# Patient Record
Sex: MALE | Race: WHITE | NOT HISPANIC OR LATINO | Employment: FULL TIME | URBAN - METROPOLITAN AREA
[De-identification: names, ages, dates, MRNs, and addresses within clinical notes are randomized per-mention and may not be internally consistent; named-entity substitution may affect disease eponyms.]

---

## 2017-08-19 ENCOUNTER — OFFICE VISIT (OUTPATIENT)
Dept: URGENT CARE | Facility: CLINIC | Age: 32
End: 2017-08-19
Payer: COMMERCIAL

## 2017-08-19 VITALS
OXYGEN SATURATION: 96 % | HEART RATE: 105 BPM | TEMPERATURE: 97 F | DIASTOLIC BLOOD PRESSURE: 86 MMHG | SYSTOLIC BLOOD PRESSURE: 145 MMHG | RESPIRATION RATE: 16 BRPM

## 2017-08-19 DIAGNOSIS — L03.317 CELLULITIS OF GLUTEAL REGION: ICD-10-CM

## 2017-08-19 DIAGNOSIS — L02.91 ABSCESS: Primary | ICD-10-CM

## 2017-08-19 PROCEDURE — 99203 OFFICE O/P NEW LOW 30 MIN: CPT | Mod: S$GLB,,, | Performed by: INTERNAL MEDICINE

## 2017-08-19 RX ORDER — SULFAMETHOXAZOLE AND TRIMETHOPRIM 800; 160 MG/1; MG/1
1 TABLET ORAL 2 TIMES DAILY
Qty: 20 TABLET | Refills: 0 | Status: SHIPPED | OUTPATIENT
Start: 2017-08-19 | End: 2017-08-29

## 2017-08-19 RX ORDER — AMOXICILLIN AND CLAVULANATE POTASSIUM 875; 125 MG/1; MG/1
1 TABLET, FILM COATED ORAL 2 TIMES DAILY
Qty: 20 TABLET | Refills: 0 | Status: SHIPPED | OUTPATIENT
Start: 2017-08-19 | End: 2017-08-29

## 2017-08-19 RX ORDER — MUPIROCIN 20 MG/G
OINTMENT TOPICAL
Qty: 22 G | Refills: 1 | Status: SHIPPED | OUTPATIENT
Start: 2017-08-19 | End: 2021-07-03

## 2017-08-19 NOTE — LETTER
August 19, 2017      [facility]  [place]  [address]  [place] [address]  Phone: [phone]  Fax: [phone]       Patient: Maxime Alcala   YOB: 1985  Date of Visit: 08/19/2017    To Whom It May Concern:    Delicia Alcala  was at [facility] on  LETTER:[id number]} may return to work/school on 08/22/2017 with no restrictions. If you have any questions or concerns, or if I can be of further assistance, please do not hesitate to contact me.    Sincerely   DR Garcia

## 2017-08-19 NOTE — PATIENT INSTRUCTIONS
Abscess (Antibiotic Treatment Only)  An abscess (sometimes called a boil) happens when bacteria get trapped under the skin and start to grow. Pus forms inside the abscess as the body responds to the bacteria. An abscess can happen with an insect bite, ingrown hair, blocked oil gland, pimple, cyst, or puncture wound.  In the early stages, your wound may be red and tender. For this stage, you may get antibiotics. If the abscess does not get better with antibiotics, it will need to be drained with a small cut.  Home care  These tips will help you care for your abscess at home:  · Soak the wound in hot water or apply hot packs (small towel soaked in hot water) to the area for 20 minutes at a time. Do this 3 to 4 times a day.  · Do not cut, squeeze, or pop the boil yourself.  · Apply antibiotic cream or ointment to the skin 3 to 4 times a day, unless something else was prescribed. Some ointments include an antibiotic plus a pain reliever.  · If your doctor prescribed antibiotics, do not stop taking them until you have finished the medicine or the doctor tells you to stop.  · You may use an over-the-counter pain medicine to control pain, unless another pain medicine was prescribed. If you have chronic liver or kidney disease or ever had a stomach ulcer or gastrointestinal bleeding, talk with your doctor before using these any of these.  Follow-up care  Follow up with your healthcare provider, or as advised. Check your wound each day for the signs of worsening infection listed below.  When to seek medical advice  Get prompt medical attention if any of these occur:  · An increase in redness or swelling  · Red streaks in the skin leading away from the abscess  · An increase in local pain or swelling  · Fever of 100.4ºF (38ºC) or higher, or as directed by your healthcare provider  · Pus or fluid coming from the abscess  · Boil returns after getting better  Date Last Reviewed: 9/1/2016  © 9040-9581 [employer]  [employer]. [address] [place] [address]. All rights reserved. This information is not intended as a substitute for professional medical care. Always follow your healthcare professional's instructions.  Please return here or go to the Emergency Department for any concerns or worsening of condition.  If you were prescribed antibiotics, please take them to completion.  If you were prescribed a narcotic medication, do not drive or operate heavy equipment or machinery while taking these medications.  Please follow up with your primary care doctor or specialist as needed.    If you  smoke, please stop smoking.  Domeboro over the counter(pulls infection out of wound)  Mix two packets to a quart of boiled water.  Make sure bowl you mixed it in has a lid and is micro-waveable .  Take 3 clean wash cloths and put one into solution that you mixed and place on affected area(very warm solution but NOT BOILING  HOT) for 10 minutes then discard to dirty laundry repeat with second clean wash cloth (dip into solution) and third wash cloth each for 10 minutes(total soaks on affected area are for 30 minutes).  Then clean area with soap and water put either Neosporin or other antibiotic creams to affected area and bandage with sterile/clean dressing.  Place lid on solution and put in refrigerator then when ready to repeat process take bowl out and place in microwave until solution is steamming .  REPEAT Put warm compresses on affected area 4 to 5 times a day for the first 5 to 6 days.  Domeboro over the counter(pulls infection out of wound)  Mix two packets to a quart of boiled water.  Make sure bowl you mixed it in has a lid and is micro-waveable .  Take 3 clean wash cloths and put one into solution that you mixed and place on affected area(very warm solution but NOT BOILING  HOT) for 10 minutes then discard to dirty laundry repeat with second clean wash cloth (dip into solution) and third wash cloth each for 10 minutes(total  soaks on affected area are for 30 minutes).  Then clean area with soap and water put either Neosporin or other antibiotic creams to affected area and bandage with sterile/clean dressing.  Place lid on solution and put in refrigerator then when ready to repeat process take bowl out and place in microwave until solution is steamming .  REPEAT Put warm compresses on affected area 4 to 5 times a day for the first 5 to 6 days.  .

## 2017-08-19 NOTE — PROGRESS NOTES
Subjective:       Patient ID: Maxime Alcala is a 31 y.o. male.    Vitals:  oral temperature is 97.3 °F (36.3 °C). His blood pressure is 145/86 (abnormal) and his pulse is 105. His respiration is 16 and oxygen saturation is 96%.     Chief Complaint: Abscess    Abscess   Chronicity:  New  Onset:  Several hours ago  Progression Since Onset: rapidly worsening  Abscess location: buttocks.  Associated Symptoms: no fever, no chills  Characteristics: draining    Pain Scale:  4/10  Treatments Tried:  Draining/squeezing  Relieved by:  Nothing  Worsened by:  Nothing    Review of Systems   Constitution: Negative for chills and fever.   HENT: Negative for headaches and sore throat.    Eyes: Negative for blurred vision.   Cardiovascular: Negative for chest pain.   Respiratory: Negative for shortness of breath.    Skin: Positive for suspicious lesions. Negative for itching and rash.   Musculoskeletal: Negative for back pain and joint pain.   Gastrointestinal: Negative for abdominal pain, diarrhea, nausea and vomiting.   Psychiatric/Behavioral: The patient is not nervous/anxious.        Objective:      Physical Exam   Constitutional: He is oriented to person, place, and time. He appears well-developed and well-nourished. He is cooperative.  Non-toxic appearance. He does not appear ill. No distress.   HENT:   Head: Normocephalic and atraumatic.   Right Ear: Hearing, tympanic membrane, external ear and ear canal normal.   Left Ear: Hearing, tympanic membrane, external ear and ear canal normal.   Nose: Nose normal. No mucosal edema, rhinorrhea or nasal deformity. No epistaxis. Right sinus exhibits no maxillary sinus tenderness and no frontal sinus tenderness. Left sinus exhibits no maxillary sinus tenderness and no frontal sinus tenderness.   Mouth/Throat: Uvula is midline, oropharynx is clear and moist and mucous membranes are normal. No trismus in the jaw. Normal dentition. No uvula swelling. No posterior oropharyngeal erythema.    Eyes: Conjunctivae and lids are normal. Right eye exhibits no discharge. Left eye exhibits no discharge. No scleral icterus.   Sclera clear bilat   Neck: Trachea normal, normal range of motion, full passive range of motion without pain and phonation normal. Neck supple.   Cardiovascular: Normal rate, regular rhythm, normal heart sounds, intact distal pulses and normal pulses.    Pulmonary/Chest: Effort normal and breath sounds normal. No respiratory distress.   Abdominal: Soft. Normal appearance and bowel sounds are normal. He exhibits no distension, no pulsatile midline mass and no mass. There is no tenderness.   Musculoskeletal: Normal range of motion. He exhibits no edema or deformity.   Neurological: He is alert and oriented to person, place, and time. He exhibits normal muscle tone. Coordination normal.   Skin: Skin is warm, dry and intact. Capillary refill takes less than 2 seconds. Lesion and rash noted. Rash is pustular. He is not diaphoretic. There is erythema. No pallor.        Psychiatric: He has a normal mood and affect. His speech is normal and behavior is normal. Judgment and thought content normal. Cognition and memory are normal.   Nursing note and vitals reviewed.      Assessment:       1. Abscess    2. Cellulitis of gluteal region        Plan:         Abscess  -     sulfamethoxazole-trimethoprim 800-160mg (BACTRIM DS) 800-160 mg Tab; Take 1 tablet by mouth 2 (two) times daily.  Dispense: 20 tablet; Refill: 0  -     amoxicillin-clavulanate 875-125mg (AUGMENTIN) 875-125 mg per tablet; Take 1 tablet by mouth 2 (two) times daily.  Dispense: 20 tablet; Refill: 0  -     mupirocin (BACTROBAN) 2 % ointment; Apply to affected area 3 times daily  Dispense: 22 g; Refill: 1    Cellulitis of gluteal region  -     sulfamethoxazole-trimethoprim 800-160mg (BACTRIM DS) 800-160 mg Tab; Take 1 tablet by mouth 2 (two) times daily.  Dispense: 20 tablet; Refill: 0  -     amoxicillin-clavulanate 875-125mg (AUGMENTIN)  875-125 mg per tablet; Take 1 tablet by mouth 2 (two) times daily.  Dispense: 20 tablet; Refill: 0  -     mupirocin (BACTROBAN) 2 % ointment; Apply to affected area 3 times daily  Dispense: 22 g; Refill: 1      take

## 2020-02-02 ENCOUNTER — OFFICE VISIT (OUTPATIENT)
Dept: URGENT CARE | Facility: CLINIC | Age: 35
End: 2020-02-02
Payer: COMMERCIAL

## 2020-02-02 VITALS
DIASTOLIC BLOOD PRESSURE: 87 MMHG | BODY MASS INDEX: 34.01 KG/M2 | TEMPERATURE: 98 F | WEIGHT: 265 LBS | SYSTOLIC BLOOD PRESSURE: 143 MMHG | HEIGHT: 74 IN | HEART RATE: 107 BPM | OXYGEN SATURATION: 99 %

## 2020-02-02 DIAGNOSIS — L02.412 ABSCESS OF AXILLA, LEFT: Primary | ICD-10-CM

## 2020-02-02 PROCEDURE — 99214 PR OFFICE/OUTPT VISIT, EST, LEVL IV, 30-39 MIN: ICD-10-PCS | Mod: S$GLB,,, | Performed by: PHYSICIAN ASSISTANT

## 2020-02-02 PROCEDURE — 99214 OFFICE O/P EST MOD 30 MIN: CPT | Mod: S$GLB,,, | Performed by: PHYSICIAN ASSISTANT

## 2020-02-02 RX ORDER — SULFAMETHOXAZOLE AND TRIMETHOPRIM 800; 160 MG/1; MG/1
1 TABLET ORAL 2 TIMES DAILY
Qty: 20 TABLET | Refills: 0 | Status: SHIPPED | OUTPATIENT
Start: 2020-02-02 | End: 2020-02-12

## 2020-02-02 RX ORDER — SULFAMETHOXAZOLE AND TRIMETHOPRIM 800; 160 MG/1; MG/1
1 TABLET ORAL 2 TIMES DAILY
Qty: 20 TABLET | Refills: 0 | Status: SHIPPED | OUTPATIENT
Start: 2020-02-02 | End: 2020-02-02 | Stop reason: SDUPTHER

## 2020-02-02 NOTE — PATIENT INSTRUCTIONS
"1.  You have been diagnosed with an abscess/cellulitis. Your abscess was not ready to be drained at this time, but may be ready in a few days. In order to help this along, you should apply warm, moist heat to this area for 10-20 minutes at a time 3-5 times daily over the next several days. As long as the wound is still closed, you can also perform warm, Epsom salt soaks for 20 minutes at a time 3-5 times daily. You should no longer soak in warm water once the wound is open.   2.  You can also apply Domeboro (over the counter) to the area. This helps to "pull infection out of the wound".  3.  Take all medications as directed. Make sure to complete your antibiotics.   4.  Rest and keep yourself/patient well hydrated. For adults, it is recommended to drink at least 8-10 glasses of water daily.   5.  For patients above 6 months of age who are not allergic to and are not on anticoagulants, you can alternate Tylenol and Motrin every 4-6 hours for fever above 100.4F and/or pain.  For patients less than 6 months of age, allergic to or intolerant to NSAIDS, have gastritis, gastric ulcers, or history of GI bleeds, are pregnant, or are on anticoagulant therapy, you can take Tylenol every 4 hours as needed for fever above 100.4F and/or pain.   6. You should return to Urgent Care or schedule a follow-up appointment with your Primary Care Provider/Pediatrician for recheck in 2-3 days or as directed at this visit.  You abscess may be ready to be drained at this time, especially if you have followed the above instructions.   7.  If your condition worsens at any time, you should report immediately to your nearest Emergency Department for further evaluation. **You must understand that you have received Urgent Care treatment only and that you may be released before all of your medical problems are known or treated. You, the patient, are responsible to arrange for follow-up care as instructed.           Abscess (Antibiotic Treatment " Only)  An abscess (sometimes called a boil) happens when bacteria get trapped under the skin and start to grow. Pus forms inside the abscess as the body responds to the bacteria. An abscess can happen with an insect bite, ingrown hair, blocked oil gland, pimple, cyst, or puncture wound.  In the early stages, your wound may be red and tender. For this stage, you may get antibiotics. If the abscess does not get better with antibiotics, it will need to be drained with a small cut.  Home care  These tips will help you care for your abscess at home:  · Soak the wound in hot water or apply hot packs (small towel soaked in hot water) to the area for 20 minutes at a time. Do this 3 to 4 times a day.  · Do not cut, squeeze, or pop the boil yourself.  · Apply antibiotic cream or ointment to the skin 3 to 4 times a day, unless something else was prescribed. Some ointments include an antibiotic plus a pain reliever.  · If your doctor prescribed antibiotics, do not stop taking them until you have finished the medicine or the doctor tells you to stop.  · You may use an over-the-counter pain medicine to control pain, unless another pain medicine was prescribed. If you have chronic liver or kidney disease or ever had a stomach ulcer or gastrointestinal bleeding, talk with your doctor before using these any of these.  Follow-up care  Follow up with your healthcare provider, or as advised. Check your wound each day for the signs of worsening infection listed below.  When to seek medical advice  Get prompt medical attention if any of these occur:  · An increase in redness or swelling  · Red streaks in the skin leading away from the abscess  · An increase in local pain or swelling  · Fever of 100.4ºF (38ºC) or higher, or as directed by your healthcare provider  · Pus or fluid coming from the abscess  · Boil returns after getting better  Date Last Reviewed: 9/1/2016  © 2953-6063 [employer]. [address] [place] [address],  [place] [address]. All rights reserved. This information is not intended as a substitute for professional medical care. Always follow your healthcare professional's instructions.

## 2020-02-02 NOTE — PROGRESS NOTES
"Subjective:       Patient ID: Maxime Alcala is a 34 y.o. male.    Vitals:  height is 6' 2" (1.88 m) and weight is 120.2 kg (265 lb). His tympanic temperature is 98.2 °F (36.8 °C). His blood pressure is 143/87 (abnormal) and his pulse is 107. His oxygen saturation is 99%.     Chief Complaint: Recurrent Skin Infections (Left arm)    34-year-old male presents to clinic today with complaints of a cyst to his left axilla that has been present for the past 2 days.  Patient denies any fever, chills, or drainage. He denies any other complaints at this time.    Rash   This is a new problem. The current episode started yesterday. The problem has been gradually worsening since onset. Location: Left armpit. The rash is characterized by redness, pain and swelling. He was exposed to nothing. Pertinent negatives include no congestion, cough, diarrhea, fatigue, fever, joint pain, shortness of breath, sore throat or vomiting. Past treatments include nothing. The treatment provided no relief. There is no history of allergies, asthma, eczema or varicella.       Constitution: Negative for chills, fatigue and fever.   HENT: Negative for congestion and sore throat.    Neck: Negative for painful lymph nodes.   Cardiovascular: Negative for chest pain and leg swelling.   Eyes: Negative for double vision and blurred vision.   Respiratory: Negative for cough and shortness of breath.    Gastrointestinal: Negative for nausea, vomiting and diarrhea.   Genitourinary: Negative for dysuria, frequency and urgency.   Musculoskeletal: Negative for joint pain, joint swelling, muscle cramps and muscle ache.   Skin: Positive for erythema and abscess. Negative for color change and pale.   Allergic/Immunologic: Negative for seasonal allergies.   Neurological: Negative for dizziness, history of vertigo, light-headedness, passing out and headaches.   Hematologic/Lymphatic: Negative for swollen lymph nodes, easy bruising/bleeding and history of blood clots. " Does not bruise/bleed easily.   Psychiatric/Behavioral: Negative for nervous/anxious, sleep disturbance and depression. The patient is not nervous/anxious.        Objective:      Physical Exam   Constitutional: He is oriented to person, place, and time. He appears well-developed and well-nourished.   HENT:   Head: Normocephalic and atraumatic. Head is without abrasion, without contusion and without laceration.   Right Ear: External ear normal.   Left Ear: External ear normal.   Nose: Nose normal.   Mouth/Throat: Oropharynx is clear and moist and mucous membranes are normal.   Eyes: Pupils are equal, round, and reactive to light. Conjunctivae, EOM and lids are normal.   Neck: Trachea normal, full passive range of motion without pain and phonation normal. Neck supple.   Cardiovascular: Normal rate, regular rhythm and normal heart sounds.   Pulmonary/Chest: Effort normal and breath sounds normal. No stridor. No respiratory distress.   Musculoskeletal: Normal range of motion.   Neurological: He is alert and oriented to person, place, and time.   Skin: Skin is warm, dry, intact, no rash and abscessed (Approximately 1 cm erythematous early abscess noted under left axilla.  Minimal induration present.  No fluctuance, drainage, or streaking noted.). Capillary refill takes less than 2 seconds. Lesions:  erythemaabrasion, burn, bruising and ecchymosis  Psychiatric: He has a normal mood and affect. His speech is normal and behavior is normal. Judgment and thought content normal. Cognition and memory are normal.   Nursing note and vitals reviewed.        Assessment:       1. Abscess of axilla, left        Plan:         Abscess of axilla, left  -     Discontinue: sulfamethoxazole-trimethoprim 800-160mg (BACTRIM DS) 800-160 mg Tab; Take 1 tablet by mouth 2 (two) times daily. for 10 days  Dispense: 20 tablet; Refill: 0  -     sulfamethoxazole-trimethoprim 800-160mg (BACTRIM DS) 800-160 mg Tab; Take 1 tablet by mouth 2 (two) times  "daily. for 10 days  Dispense: 20 tablet; Refill: 0     Recheck in 48 hr.  Possible I&D.  Patient Instructions   1.  You have been diagnosed with an abscess/cellulitis. Your abscess was not ready to be drained at this time, but may be ready in a few days. In order to help this along, you should apply warm, moist heat to this area for 10-20 minutes at a time 3-5 times daily over the next several days. As long as the wound is still closed, you can also perform warm, Epsom salt soaks for 20 minutes at a time 3-5 times daily. You should no longer soak in warm water once the wound is open.   2.  You can also apply Domeboro (over the counter) to the area. This helps to "pull infection out of the wound".  3.  Take all medications as directed. Make sure to complete your antibiotics.   4.  Rest and keep yourself/patient well hydrated. For adults, it is recommended to drink at least 8-10 glasses of water daily.   5.  For patients above 6 months of age who are not allergic to and are not on anticoagulants, you can alternate Tylenol and Motrin every 4-6 hours for fever above 100.4F and/or pain.  For patients less than 6 months of age, allergic to or intolerant to NSAIDS, have gastritis, gastric ulcers, or history of GI bleeds, are pregnant, or are on anticoagulant therapy, you can take Tylenol every 4 hours as needed for fever above 100.4F and/or pain.   6. You should return to Urgent Care or schedule a follow-up appointment with your Primary Care Provider/Pediatrician for recheck in 2-3 days or as directed at this visit.  You abscess may be ready to be drained at this time, especially if you have followed the above instructions.   7.  If your condition worsens at any time, you should report immediately to your nearest Emergency Department for further evaluation. **You must understand that you have received Urgent Care treatment only and that you may be released before all of your medical problems are known or treated. You, the " patient, are responsible to arrange for follow-up care as instructed.           Abscess (Antibiotic Treatment Only)  An abscess (sometimes called a boil) happens when bacteria get trapped under the skin and start to grow. Pus forms inside the abscess as the body responds to the bacteria. An abscess can happen with an insect bite, ingrown hair, blocked oil gland, pimple, cyst, or puncture wound.  In the early stages, your wound may be red and tender. For this stage, you may get antibiotics. If the abscess does not get better with antibiotics, it will need to be drained with a small cut.  Home care  These tips will help you care for your abscess at home:  · Soak the wound in hot water or apply hot packs (small towel soaked in hot water) to the area for 20 minutes at a time. Do this 3 to 4 times a day.  · Do not cut, squeeze, or pop the boil yourself.  · Apply antibiotic cream or ointment to the skin 3 to 4 times a day, unless something else was prescribed. Some ointments include an antibiotic plus a pain reliever.  · If your doctor prescribed antibiotics, do not stop taking them until you have finished the medicine or the doctor tells you to stop.  · You may use an over-the-counter pain medicine to control pain, unless another pain medicine was prescribed. If you have chronic liver or kidney disease or ever had a stomach ulcer or gastrointestinal bleeding, talk with your doctor before using these any of these.  Follow-up care  Follow up with your healthcare provider, or as advised. Check your wound each day for the signs of worsening infection listed below.  When to seek medical advice  Get prompt medical attention if any of these occur:  · An increase in redness or swelling  · Red streaks in the skin leading away from the abscess  · An increase in local pain or swelling  · Fever of 100.4ºF (38ºC) or higher, or as directed by your healthcare provider  · Pus or fluid coming from the abscess  · Boil returns after  getting better  Date Last Reviewed: 9/1/2016  © 4750-8391 [employer] [address] [place] [address], [place] [address]. All rights reserved. This information is not intended as a substitute for professional medical care. Always follow your healthcare professional's instructions.

## 2021-07-03 ENCOUNTER — OFFICE VISIT (OUTPATIENT)
Dept: URGENT CARE | Facility: CLINIC | Age: 36
End: 2021-07-03
Payer: COMMERCIAL

## 2021-07-03 VITALS
BODY MASS INDEX: 34.01 KG/M2 | OXYGEN SATURATION: 99 % | DIASTOLIC BLOOD PRESSURE: 66 MMHG | HEART RATE: 103 BPM | SYSTOLIC BLOOD PRESSURE: 141 MMHG | TEMPERATURE: 99 F | HEIGHT: 74 IN | RESPIRATION RATE: 20 BRPM | WEIGHT: 265 LBS

## 2021-07-03 DIAGNOSIS — L02.32 BOIL OF BUTTOCK: Primary | ICD-10-CM

## 2021-07-03 PROCEDURE — 3008F BODY MASS INDEX DOCD: CPT | Mod: CPTII,S$GLB,, | Performed by: NURSE PRACTITIONER

## 2021-07-03 PROCEDURE — 1125F AMNT PAIN NOTED PAIN PRSNT: CPT | Mod: S$GLB,,, | Performed by: NURSE PRACTITIONER

## 2021-07-03 PROCEDURE — 1125F PR PAIN SEVERITY QUANTIFIED, PAIN PRESENT: ICD-10-PCS | Mod: S$GLB,,, | Performed by: NURSE PRACTITIONER

## 2021-07-03 PROCEDURE — 99214 OFFICE O/P EST MOD 30 MIN: CPT | Mod: S$GLB,,, | Performed by: NURSE PRACTITIONER

## 2021-07-03 PROCEDURE — 99214 PR OFFICE/OUTPT VISIT, EST, LEVL IV, 30-39 MIN: ICD-10-PCS | Mod: S$GLB,,, | Performed by: NURSE PRACTITIONER

## 2021-07-03 PROCEDURE — 3008F PR BODY MASS INDEX (BMI) DOCUMENTED: ICD-10-PCS | Mod: CPTII,S$GLB,, | Performed by: NURSE PRACTITIONER

## 2021-07-03 RX ORDER — MUPIROCIN 20 MG/G
OINTMENT TOPICAL
Qty: 22 G | Refills: 1 | Status: SHIPPED | OUTPATIENT
Start: 2021-07-03

## 2021-07-03 RX ORDER — LOSARTAN POTASSIUM 100 MG/1
100 TABLET ORAL DAILY
COMMUNITY

## 2021-07-03 RX ORDER — AMLODIPINE BESYLATE 10 MG/1
10 TABLET ORAL DAILY
COMMUNITY

## 2021-07-03 RX ORDER — SULFAMETHOXAZOLE AND TRIMETHOPRIM 800; 160 MG/1; MG/1
1 TABLET ORAL 2 TIMES DAILY
Qty: 20 TABLET | Refills: 0 | Status: SHIPPED | OUTPATIENT
Start: 2021-07-03 | End: 2021-07-13

## 2022-01-04 ENCOUNTER — APPOINTMENT (OUTPATIENT)
Dept: PRIMARY CARE CLINIC | Facility: CLINIC | Age: 37
End: 2022-01-04

## 2022-01-04 DIAGNOSIS — R05.9 COUGH: ICD-10-CM

## 2022-01-04 PROCEDURE — U0002 COVID-19 LAB TEST NON-CDC: HCPCS | Performed by: STUDENT IN AN ORGANIZED HEALTH CARE EDUCATION/TRAINING PROGRAM

## 2022-01-05 LAB — SARS-COV-2 RNA RESP QL NAA+PROBE: NOT DETECTED
